# Patient Record
Sex: FEMALE | Race: WHITE | Employment: FULL TIME | URBAN - METROPOLITAN AREA
[De-identification: names, ages, dates, MRNs, and addresses within clinical notes are randomized per-mention and may not be internally consistent; named-entity substitution may affect disease eponyms.]

---

## 2020-03-11 ENCOUNTER — HOSPITAL ENCOUNTER (OUTPATIENT)
Dept: CT IMAGING | Age: 45
Discharge: HOME | End: 2020-03-11
Attending: INTERNAL MEDICINE
Payer: COMMERCIAL

## 2020-03-11 DIAGNOSIS — R07.9 CHEST PAIN: ICD-10-CM

## 2020-03-11 DIAGNOSIS — R94.39 ABNORMAL STRESS ELECTROCARDIOGRAM TEST USING TREADMILL: ICD-10-CM

## 2020-03-11 PROCEDURE — 75574 CT ANGIO HRT W/3D IMAGE: CPT

## 2020-03-11 PROCEDURE — 74011636320 HC RX REV CODE- 636/320: Performed by: STUDENT IN AN ORGANIZED HEALTH CARE EDUCATION/TRAINING PROGRAM

## 2020-03-11 RX ORDER — IODIXANOL 320 MG/ML
150 INJECTION, SOLUTION INTRAVASCULAR
Status: COMPLETED | OUTPATIENT
Start: 2020-03-11 | End: 2020-03-11

## 2020-03-11 RX ADMIN — IODIXANOL 150 ML: 320 INJECTION, SOLUTION INTRAVASCULAR at 13:00

## 2021-07-09 ENCOUNTER — HOSPITAL ENCOUNTER (OUTPATIENT)
Dept: PREADMISSION TESTING | Age: 46
Discharge: HOME | End: 2021-07-09
Payer: COMMERCIAL

## 2021-07-09 VITALS
WEIGHT: 196.3 LBS | TEMPERATURE: 98 F | RESPIRATION RATE: 20 BRPM | SYSTOLIC BLOOD PRESSURE: 156 MMHG | OXYGEN SATURATION: 100 % | BODY MASS INDEX: 33.51 KG/M2 | HEIGHT: 64 IN | DIASTOLIC BLOOD PRESSURE: 88 MMHG | HEART RATE: 61 BPM

## 2021-07-09 LAB
ABO + RH BLD: NORMAL
ANION GAP SERPL CALC-SCNC: 5 MMOL/L (ref 5–15)
BLOOD GROUP ANTIBODIES SERPL: NORMAL
BUN SERPL-MCNC: 18 MG/DL (ref 6–20)
BUN/CREAT SERPL: 28 (ref 12–20)
CALCIUM SERPL-MCNC: 8.5 MG/DL (ref 8.5–10.1)
CHLORIDE SERPL-SCNC: 104 MMOL/L (ref 97–108)
CO2 SERPL-SCNC: 30 MMOL/L (ref 21–32)
CREAT SERPL-MCNC: 0.64 MG/DL (ref 0.55–1.02)
ERYTHROCYTE [DISTWIDTH] IN BLOOD BY AUTOMATED COUNT: 14 % (ref 11.5–14.5)
GLUCOSE SERPL-MCNC: 116 MG/DL (ref 65–100)
HCT VFR BLD AUTO: 39.1 % (ref 35–47)
HGB BLD-MCNC: 13 G/DL (ref 11.5–16)
MCH RBC QN AUTO: 27.1 PG (ref 26–34)
MCHC RBC AUTO-ENTMCNC: 33.2 G/DL (ref 30–36.5)
MCV RBC AUTO: 81.6 FL (ref 80–99)
NRBC # BLD: 0 K/UL (ref 0–0.01)
NRBC BLD-RTO: 0 PER 100 WBC
PLATELET # BLD AUTO: 369 K/UL (ref 150–400)
PMV BLD AUTO: 10 FL (ref 8.9–12.9)
POTASSIUM SERPL-SCNC: 2.8 MMOL/L (ref 3.5–5.1)
RBC # BLD AUTO: 4.79 M/UL (ref 3.8–5.2)
SODIUM SERPL-SCNC: 139 MMOL/L (ref 136–145)
SPECIMEN EXP DATE BLD: NORMAL
WBC # BLD AUTO: 9.7 K/UL (ref 3.6–11)

## 2021-07-09 PROCEDURE — 36415 COLL VENOUS BLD VENIPUNCTURE: CPT

## 2021-07-09 PROCEDURE — 85027 COMPLETE CBC AUTOMATED: CPT

## 2021-07-09 PROCEDURE — 86901 BLOOD TYPING SEROLOGIC RH(D): CPT

## 2021-07-09 PROCEDURE — 93005 ELECTROCARDIOGRAM TRACING: CPT

## 2021-07-09 PROCEDURE — U0005 INFEC AGEN DETEC AMPLI PROBE: HCPCS

## 2021-07-09 PROCEDURE — 80048 BASIC METABOLIC PNL TOTAL CA: CPT

## 2021-07-09 RX ORDER — BISMUTH SUBSALICYLATE 262 MG
1 TABLET,CHEWABLE ORAL DAILY
COMMUNITY

## 2021-07-09 RX ORDER — MEGESTROL ACETATE 40 MG/1
40 TABLET ORAL DAILY
COMMUNITY

## 2021-07-09 RX ORDER — LEVOTHYROXINE SODIUM 25 UG/1
25 TABLET ORAL
COMMUNITY

## 2021-07-09 RX ORDER — DILTIAZEM HYDROCHLORIDE 120 MG/1
120 CAPSULE, EXTENDED RELEASE ORAL DAILY
COMMUNITY

## 2021-07-09 RX ORDER — LORATADINE 10 MG/1
10 TABLET ORAL
COMMUNITY

## 2021-07-09 RX ORDER — HYDROCHLOROTHIAZIDE 25 MG/1
25 TABLET ORAL DAILY
COMMUNITY

## 2021-07-09 NOTE — PERIOP NOTES
[address] , [address] [facility]   MAIN OR                                  [phone]   MAIN PRE OP                          [phone]                                                                                AMBULATORY PRE OP          [phone]  PRE-ADMISSION TESTING    [phone]   Surgery Date:     Thursday 7/15/21        Is surgery arrival time given by surgeon? NO  If NO, [facility] staff will call you between 3 and 7pm the day before your surgery with your arrival time. (If your surgery is on a Monday, we will call you the Friday before.)    Call [phone] after 7pm Monday-Friday if you did not receive this call. INSTRUCTIONS BEFORE YOUR SURGERY   When You  Arrive Arrive at the [address] [facility] on the day of your surgery  Have your insurance card, photo ID, and any copayment (if needed)   Food   and   Drink NO food or drink after midnight the night before surgery    This means NO water, gum, mints, coffee, juice, etc.  No alcohol (beer, wine, liquor) 24 hours before and after surgery   Medications to   TAKE   Morning of Surgery MEDICATIONS TO TAKE THE MORNING OF SURGERY WITH A SIP OF WATER:    Claritin   Diltiazem   Synthroid   Megace   Medications  To  STOP      7 days before surgery  Non-Steroidal anti-inflammatory Drugs (NSAID's): for example, Ibuprofen (Advil, Motrin), Naproxen (Aleve)   Aspirin, if taking for pain    Herbal supplements, vitamins, and fish oil   Other:  (Pain medications not listed above, including Tylenol may be taken)   Blood  Thinners     Bathing Clothing  Jewelry  Valuables      If you shower the morning of surgery, please do not apply anything to your skin (lotions, powders, deodorant, or makeup, especially mascara)   Follow Chlorhex[employer] body wash instructions provided to you during PAT appointment. Begin 3 days prior to surgery.    Do not shave or trim anywhere 24 hours before surgery   Wear your hair loose or down; no pony-tails, buns, or metal hair clips   Wear loose, comfortable, clean clothes   Wear glasses instead of contacts  [address], valuables, and jewelry, including body piercings, at home   If you were given an [employer], bring it on day of surgery. Going Home - or Spending the Night  SAME-DAY SURGERY: You must have a responsible adult drive you home and stay with you 24 hours after surgery   ADMITS: If your doctor is keeping you in the hospital after surgery, leave personal belongings/luggage in your car until you have a hospital room number. Hospital discharge time is 12 noon  Drivers must be here before 12 noon unless you are told differently   Special Instructions        Follow all instructions so your surgery wont be cancelled. Please, be on time. If a situation occurs and you are delayed the day of surgery, call  [phone]. If your physical condition changes (like a fever, cold, flu, etc.) call your surgeon. Home medication(s) reviewed and verified via    LIST   VERBAL   during PAT appointment. The patient was contacted by IN-PERSON    The patient verbalizes understanding of all instructions and      DOES NOT   need reinforcement.

## 2021-07-10 LAB
ATRIAL RATE: 56 BPM
CALCULATED P AXIS, ECG09: 45 DEGREES
CALCULATED R AXIS, ECG10: -12 DEGREES
CALCULATED T AXIS, ECG11: 46 DEGREES
DIAGNOSIS, 93000: NORMAL
P-R INTERVAL, ECG05: 158 MS
Q-T INTERVAL, ECG07: 426 MS
QRS DURATION, ECG06: 96 MS
QTC CALCULATION (BEZET), ECG08: 411 MS
SARS-COV-2, XPLCVT: NOT DETECTED
SOURCE, COVRS: NORMAL
VENTRICULAR RATE, ECG03: 56 BPM

## 2021-07-12 RX ORDER — POTASSIUM CHLORIDE 750 MG/1
20 TABLET, FILM COATED, EXTENDED RELEASE ORAL 2 TIMES DAILY
Qty: 28 TABLET | Refills: 0 | Status: SHIPPED | OUTPATIENT
Start: 2021-07-12 | End: 2021-07-19

## 2021-07-12 NOTE — PROGRESS NOTES
CMP shows low potassium at 2.8. Script sent in for Potassium 10meq 2 tablets BID x 7 days. Routed to surgeon and PCP for review. Called and notified patient.

## 2021-07-12 NOTE — PERIOP NOTES
Call placed to pt to notify her of low potassium level and prescription sent to pharmacy by PORSCHE Lnodon. Reviewed instructions of dosing x 7 days. Pt states she has had a low level and was supposed to be eating more bananas but was not doing as well with that as she should.  States she will be picking up her prescription today

## 2021-07-14 ENCOUNTER — ANESTHESIA EVENT (OUTPATIENT)
Dept: SURGERY | Age: 46
End: 2021-07-14
Payer: COMMERCIAL

## 2021-07-14 NOTE — ANESTHESIA PREPROCEDURE EVALUATION
Relevant Problems   No relevant active problems       Anesthetic History   No history of anesthetic complications            Review of Systems / Medical History  Patient summary reviewed, nursing notes reviewed and pertinent labs reviewed    Pulmonary  Within defined limits                 Neuro/Psych   Within defined limits           Cardiovascular    Hypertension                   GI/Hepatic/Renal  Within defined limits              Endo/Other      Hypothyroidism       Other Findings              Physical Exam    Airway  Mallampati: II  TM Distance: 4 - 6 cm  Neck ROM: normal range of motion   Mouth opening: Normal     Cardiovascular    Rhythm: regular  Rate: normal         Dental    Dentition: Lower dentition intact, Upper dentition intact and Caps/crowns     Pulmonary  Breath sounds clear to auscultation               Abdominal         Other Findings            Anesthetic Plan    ASA: 2  Anesthesia type: general          Induction: Intravenous  Anesthetic plan and risks discussed with: Patient

## 2021-07-15 ENCOUNTER — ANESTHESIA (OUTPATIENT)
Dept: SURGERY | Age: 46
End: 2021-07-15
Payer: COMMERCIAL

## 2021-07-15 ENCOUNTER — HOSPITAL ENCOUNTER (OUTPATIENT)
Age: 46
Discharge: HOME | End: 2021-07-16
Attending: STUDENT IN AN ORGANIZED HEALTH CARE EDUCATION/TRAINING PROGRAM | Admitting: STUDENT IN AN ORGANIZED HEALTH CARE EDUCATION/TRAINING PROGRAM
Payer: COMMERCIAL

## 2021-07-15 DIAGNOSIS — L76.82 PAIN AT SURGICAL INCISION: Primary | ICD-10-CM

## 2021-07-15 PROBLEM — N93.9 ABNORMAL UTERINE BLEEDING (AUB): Status: ACTIVE | Noted: 2021-07-15

## 2021-07-15 PROBLEM — D25.9 FIBROID UTERUS: Status: ACTIVE | Noted: 2021-07-15

## 2021-07-15 PROBLEM — N93.9 ABNORMAL UTERINE BLEEDING: Status: ACTIVE | Noted: 2021-07-15

## 2021-07-15 LAB — HCG UR QL: NEGATIVE

## 2021-07-15 PROCEDURE — 77030040922 HC BLNKT HYPOTHRM STRY -A

## 2021-07-15 PROCEDURE — 76210000037 HC AMBSU PH I REC 2 TO 2.5 HR: Performed by: STUDENT IN AN ORGANIZED HEALTH CARE EDUCATION/TRAINING PROGRAM

## 2021-07-15 PROCEDURE — 77030031139 HC SUT VCRL2 J&J -A: Performed by: STUDENT IN AN ORGANIZED HEALTH CARE EDUCATION/TRAINING PROGRAM

## 2021-07-15 PROCEDURE — 2709999900 HC NON-CHARGEABLE SUPPLY: Performed by: STUDENT IN AN ORGANIZED HEALTH CARE EDUCATION/TRAINING PROGRAM

## 2021-07-15 PROCEDURE — 74011250636 HC RX REV CODE- 250/636: Performed by: ANESTHESIOLOGY

## 2021-07-15 PROCEDURE — 77030008684 HC TU ET CUF COVD -B: Performed by: SURGERY

## 2021-07-15 PROCEDURE — 77030020263 HC SOL INJ SOD CL0.9% LFCR 1000ML: Performed by: STUDENT IN AN ORGANIZED HEALTH CARE EDUCATION/TRAINING PROGRAM

## 2021-07-15 PROCEDURE — 77030008756 HC TU IRR SUC STRY -B: Performed by: STUDENT IN AN ORGANIZED HEALTH CARE EDUCATION/TRAINING PROGRAM

## 2021-07-15 PROCEDURE — 74011250636 HC RX REV CODE- 250/636: Performed by: SURGERY

## 2021-07-15 PROCEDURE — 99218 HC RM OBSERVATION: CPT

## 2021-07-15 PROCEDURE — 76030000004 HC AMB SURG OR TIME 2 TO 2.5: Performed by: STUDENT IN AN ORGANIZED HEALTH CARE EDUCATION/TRAINING PROGRAM

## 2021-07-15 PROCEDURE — 74011250636 HC RX REV CODE- 250/636: Performed by: STUDENT IN AN ORGANIZED HEALTH CARE EDUCATION/TRAINING PROGRAM

## 2021-07-15 PROCEDURE — 74011000250 HC RX REV CODE- 250: Performed by: STUDENT IN AN ORGANIZED HEALTH CARE EDUCATION/TRAINING PROGRAM

## 2021-07-15 PROCEDURE — 77030020829: Performed by: STUDENT IN AN ORGANIZED HEALTH CARE EDUCATION/TRAINING PROGRAM

## 2021-07-15 PROCEDURE — 77030013079 HC BLNKT BAIR HGGR 3M -A: Performed by: SURGERY

## 2021-07-15 PROCEDURE — 77030034154 HC SHR COAG HARM ACE J&J -F: Performed by: STUDENT IN AN ORGANIZED HEALTH CARE EDUCATION/TRAINING PROGRAM

## 2021-07-15 PROCEDURE — 76060000063 HC AMB SURG ANES 1.5 TO 2 HR: Performed by: STUDENT IN AN ORGANIZED HEALTH CARE EDUCATION/TRAINING PROGRAM

## 2021-07-15 PROCEDURE — 74011000250 HC RX REV CODE- 250: Performed by: SURGERY

## 2021-07-15 PROCEDURE — 76030000003 HC AMB SURG OR TIME 1.5 TO 2: Performed by: STUDENT IN AN ORGANIZED HEALTH CARE EDUCATION/TRAINING PROGRAM

## 2021-07-15 PROCEDURE — 76060000064 HC AMB SURG ANES 2 TO 2.5 HR: Performed by: STUDENT IN AN ORGANIZED HEALTH CARE EDUCATION/TRAINING PROGRAM

## 2021-07-15 PROCEDURE — 77030002933 HC SUT MCRYL J&J -A: Performed by: STUDENT IN AN ORGANIZED HEALTH CARE EDUCATION/TRAINING PROGRAM

## 2021-07-15 PROCEDURE — 77030040361 HC SLV COMPR DVT MDII -B

## 2021-07-15 PROCEDURE — 74011250637 HC RX REV CODE- 250/637: Performed by: STUDENT IN AN ORGANIZED HEALTH CARE EDUCATION/TRAINING PROGRAM

## 2021-07-15 PROCEDURE — 77030010507 HC ADH SKN DERMBND J&J -B: Performed by: STUDENT IN AN ORGANIZED HEALTH CARE EDUCATION/TRAINING PROGRAM

## 2021-07-15 PROCEDURE — 77030026438 HC STYL ET INTUB CARD -A: Performed by: SURGERY

## 2021-07-15 PROCEDURE — 81025 URINE PREGNANCY TEST: CPT

## 2021-07-15 PROCEDURE — 88307 TISSUE EXAM BY PATHOLOGIST: CPT

## 2021-07-15 PROCEDURE — 77030012770 HC TRCR OPT FX AMR -B: Performed by: STUDENT IN AN ORGANIZED HEALTH CARE EDUCATION/TRAINING PROGRAM

## 2021-07-15 RX ORDER — SODIUM CHLORIDE, SODIUM LACTATE, POTASSIUM CHLORIDE, CALCIUM CHLORIDE 600; 310; 30; 20 MG/100ML; MG/100ML; MG/100ML; MG/100ML
100 INJECTION, SOLUTION INTRAVENOUS CONTINUOUS
Status: DISCONTINUED | OUTPATIENT
Start: 2021-07-15 | End: 2021-07-15 | Stop reason: HOSPADM

## 2021-07-15 RX ORDER — BUPIVACAINE HYDROCHLORIDE 5 MG/ML
INJECTION, SOLUTION EPIDURAL; INTRACAUDAL AS NEEDED
Status: DISCONTINUED | OUTPATIENT
Start: 2021-07-15 | End: 2021-07-15 | Stop reason: HOSPADM

## 2021-07-15 RX ORDER — DIPHENHYDRAMINE HYDROCHLORIDE 50 MG/ML
12.5 INJECTION, SOLUTION INTRAMUSCULAR; INTRAVENOUS AS NEEDED
Status: DISCONTINUED | OUTPATIENT
Start: 2021-07-15 | End: 2021-07-15 | Stop reason: HOSPADM

## 2021-07-15 RX ORDER — LIDOCAINE HYDROCHLORIDE 20 MG/ML
INJECTION, SOLUTION EPIDURAL; INFILTRATION; INTRACAUDAL; PERINEURAL AS NEEDED
Status: DISCONTINUED | OUTPATIENT
Start: 2021-07-15 | End: 2021-07-15 | Stop reason: HOSPADM

## 2021-07-15 RX ORDER — OXYCODONE AND ACETAMINOPHEN 5; 325 MG/1; MG/1
1 TABLET ORAL
Status: DISCONTINUED | OUTPATIENT
Start: 2021-07-15 | End: 2021-07-16 | Stop reason: HOSPADM

## 2021-07-15 RX ORDER — KETOROLAC TROMETHAMINE 30 MG/ML
30 INJECTION, SOLUTION INTRAMUSCULAR; INTRAVENOUS EVERY 6 HOURS
Status: DISCONTINUED | OUTPATIENT
Start: 2021-07-15 | End: 2021-07-16 | Stop reason: HOSPADM

## 2021-07-15 RX ORDER — HYDROMORPHONE HYDROCHLORIDE 1 MG/ML
.25-1 INJECTION, SOLUTION INTRAMUSCULAR; INTRAVENOUS; SUBCUTANEOUS
Status: DISCONTINUED | OUTPATIENT
Start: 2021-07-15 | End: 2021-07-15 | Stop reason: HOSPADM

## 2021-07-15 RX ORDER — HYDROMORPHONE HYDROCHLORIDE 2 MG/ML
INJECTION, SOLUTION INTRAMUSCULAR; INTRAVENOUS; SUBCUTANEOUS AS NEEDED
Status: DISCONTINUED | OUTPATIENT
Start: 2021-07-15 | End: 2021-07-15 | Stop reason: HOSPADM

## 2021-07-15 RX ORDER — SODIUM CHLORIDE 0.9 % (FLUSH) 0.9 %
5-40 SYRINGE (ML) INJECTION EVERY 8 HOURS
Status: DISCONTINUED | OUTPATIENT
Start: 2021-07-15 | End: 2021-07-15 | Stop reason: HOSPADM

## 2021-07-15 RX ORDER — SODIUM CHLORIDE 0.9 % (FLUSH) 0.9 %
5-40 SYRINGE (ML) INJECTION AS NEEDED
Status: DISCONTINUED | OUTPATIENT
Start: 2021-07-15 | End: 2021-07-16 | Stop reason: HOSPADM

## 2021-07-15 RX ORDER — HYDROMORPHONE HYDROCHLORIDE 1 MG/ML
1 INJECTION, SOLUTION INTRAMUSCULAR; INTRAVENOUS; SUBCUTANEOUS
Status: DISCONTINUED | OUTPATIENT
Start: 2021-07-15 | End: 2021-07-16 | Stop reason: HOSPADM

## 2021-07-15 RX ORDER — ROCURONIUM BROMIDE 10 MG/ML
INJECTION, SOLUTION INTRAVENOUS AS NEEDED
Status: DISCONTINUED | OUTPATIENT
Start: 2021-07-15 | End: 2021-07-15 | Stop reason: HOSPADM

## 2021-07-15 RX ORDER — LIDOCAINE HYDROCHLORIDE 10 MG/ML
0.1 INJECTION, SOLUTION EPIDURAL; INFILTRATION; INTRACAUDAL; PERINEURAL AS NEEDED
Status: DISCONTINUED | OUTPATIENT
Start: 2021-07-15 | End: 2021-07-15 | Stop reason: HOSPADM

## 2021-07-15 RX ORDER — PROPOFOL 10 MG/ML
INJECTION, EMULSION INTRAVENOUS AS NEEDED
Status: DISCONTINUED | OUTPATIENT
Start: 2021-07-15 | End: 2021-07-15 | Stop reason: HOSPADM

## 2021-07-15 RX ORDER — DIPHENHYDRAMINE HYDROCHLORIDE 50 MG/ML
12.5 INJECTION, SOLUTION INTRAMUSCULAR; INTRAVENOUS
Status: DISCONTINUED | OUTPATIENT
Start: 2021-07-15 | End: 2021-07-16 | Stop reason: HOSPADM

## 2021-07-15 RX ORDER — SODIUM CHLORIDE, SODIUM LACTATE, POTASSIUM CHLORIDE, CALCIUM CHLORIDE 600; 310; 30; 20 MG/100ML; MG/100ML; MG/100ML; MG/100ML
125 INJECTION, SOLUTION INTRAVENOUS CONTINUOUS
Status: DISCONTINUED | OUTPATIENT
Start: 2021-07-15 | End: 2021-07-15 | Stop reason: HOSPADM

## 2021-07-15 RX ORDER — SODIUM CHLORIDE 0.9 % (FLUSH) 0.9 %
5-40 SYRINGE (ML) INJECTION EVERY 8 HOURS
Status: DISCONTINUED | OUTPATIENT
Start: 2021-07-15 | End: 2021-07-16 | Stop reason: HOSPADM

## 2021-07-15 RX ORDER — MIDAZOLAM HYDROCHLORIDE 1 MG/ML
INJECTION, SOLUTION INTRAMUSCULAR; INTRAVENOUS AS NEEDED
Status: DISCONTINUED | OUTPATIENT
Start: 2021-07-15 | End: 2021-07-15 | Stop reason: HOSPADM

## 2021-07-15 RX ORDER — DOCUSATE SODIUM 100 MG/1
100 CAPSULE, LIQUID FILLED ORAL 2 TIMES DAILY
Status: DISCONTINUED | OUTPATIENT
Start: 2021-07-15 | End: 2021-07-16 | Stop reason: HOSPADM

## 2021-07-15 RX ORDER — SODIUM CHLORIDE, SODIUM LACTATE, POTASSIUM CHLORIDE, CALCIUM CHLORIDE 600; 310; 30; 20 MG/100ML; MG/100ML; MG/100ML; MG/100ML
75 INJECTION, SOLUTION INTRAVENOUS CONTINUOUS
Status: DISCONTINUED | OUTPATIENT
Start: 2021-07-15 | End: 2021-07-15 | Stop reason: HOSPADM

## 2021-07-15 RX ORDER — HYDRALAZINE HYDROCHLORIDE 20 MG/ML
5 INJECTION INTRAMUSCULAR; INTRAVENOUS ONCE
Status: COMPLETED | OUTPATIENT
Start: 2021-07-15 | End: 2021-07-15

## 2021-07-15 RX ORDER — ONDANSETRON 2 MG/ML
4 INJECTION INTRAMUSCULAR; INTRAVENOUS
Status: DISCONTINUED | OUTPATIENT
Start: 2021-07-15 | End: 2021-07-16 | Stop reason: HOSPADM

## 2021-07-15 RX ORDER — SODIUM CHLORIDE 0.9 % (FLUSH) 0.9 %
5-40 SYRINGE (ML) INJECTION AS NEEDED
Status: DISCONTINUED | OUTPATIENT
Start: 2021-07-15 | End: 2021-07-15 | Stop reason: HOSPADM

## 2021-07-15 RX ORDER — NIFEDIPINE 10 MG/1
10 CAPSULE ORAL AS NEEDED
Status: DISCONTINUED | OUTPATIENT
Start: 2021-07-15 | End: 2021-07-16 | Stop reason: HOSPADM

## 2021-07-15 RX ORDER — ONDANSETRON 2 MG/ML
INJECTION INTRAMUSCULAR; INTRAVENOUS AS NEEDED
Status: DISCONTINUED | OUTPATIENT
Start: 2021-07-15 | End: 2021-07-15 | Stop reason: HOSPADM

## 2021-07-15 RX ORDER — ONDANSETRON 2 MG/ML
4 INJECTION INTRAMUSCULAR; INTRAVENOUS AS NEEDED
Status: DISCONTINUED | OUTPATIENT
Start: 2021-07-15 | End: 2021-07-15 | Stop reason: HOSPADM

## 2021-07-15 RX ORDER — DEXAMETHASONE SODIUM PHOSPHATE 4 MG/ML
INJECTION, SOLUTION INTRA-ARTICULAR; INTRALESIONAL; INTRAMUSCULAR; INTRAVENOUS; SOFT TISSUE AS NEEDED
Status: DISCONTINUED | OUTPATIENT
Start: 2021-07-15 | End: 2021-07-15 | Stop reason: HOSPADM

## 2021-07-15 RX ADMIN — HYDROMORPHONE HYDROCHLORIDE 0.5 MG: 2 INJECTION INTRAMUSCULAR; INTRAVENOUS; SUBCUTANEOUS at 10:32

## 2021-07-15 RX ADMIN — CEFAZOLIN SODIUM 2 G: 1 POWDER, FOR SOLUTION INTRAMUSCULAR; INTRAVENOUS at 10:10

## 2021-07-15 RX ADMIN — ROCURONIUM BROMIDE 50 MG: 10 INJECTION INTRAVENOUS at 09:53

## 2021-07-15 RX ADMIN — KETOROLAC TROMETHAMINE 30 MG: 30 INJECTION, SOLUTION INTRAMUSCULAR; INTRAVENOUS at 17:30

## 2021-07-15 RX ADMIN — HYDROMORPHONE HYDROCHLORIDE 0.5 MG: 2 INJECTION INTRAMUSCULAR; INTRAVENOUS; SUBCUTANEOUS at 11:15

## 2021-07-15 RX ADMIN — KETOROLAC TROMETHAMINE 30 MG: 30 INJECTION, SOLUTION INTRAMUSCULAR; INTRAVENOUS at 22:55

## 2021-07-15 RX ADMIN — OXYCODONE HYDROCHLORIDE AND ACETAMINOPHEN 1 TABLET: 5; 325 TABLET ORAL at 21:09

## 2021-07-15 RX ADMIN — ONDANSETRON HYDROCHLORIDE 4 MG: 2 SOLUTION INTRAMUSCULAR; INTRAVENOUS at 11:49

## 2021-07-15 RX ADMIN — DEXAMETHASONE SODIUM PHOSPHATE 8 MG: 4 INJECTION, SOLUTION INTRAMUSCULAR; INTRAVENOUS at 10:18

## 2021-07-15 RX ADMIN — DOCUSATE SODIUM 100 MG: 100 CAPSULE ORAL at 17:35

## 2021-07-15 RX ADMIN — PROPOFOL 50 MG: 10 INJECTION, EMULSION INTRAVENOUS at 11:15

## 2021-07-15 RX ADMIN — MIDAZOLAM 2 MG: 1 INJECTION, SOLUTION INTRAMUSCULAR; INTRAVENOUS at 09:48

## 2021-07-15 RX ADMIN — PROPOFOL 200 MG: 10 INJECTION, EMULSION INTRAVENOUS at 09:53

## 2021-07-15 RX ADMIN — HYDROMORPHONE HYDROCHLORIDE 0.5 MG: 2 INJECTION INTRAMUSCULAR; INTRAVENOUS; SUBCUTANEOUS at 11:01

## 2021-07-15 RX ADMIN — Medication 10 ML: at 17:31

## 2021-07-15 RX ADMIN — HYDRALAZINE HYDROCHLORIDE 5 MG: 20 INJECTION INTRAMUSCULAR; INTRAVENOUS at 13:04

## 2021-07-15 RX ADMIN — LIDOCAINE HYDROCHLORIDE 100 MG: 20 INJECTION, SOLUTION INTRAVENOUS at 09:53

## 2021-07-15 RX ADMIN — HYDROMORPHONE HYDROCHLORIDE 0.5 MG: 2 INJECTION INTRAMUSCULAR; INTRAVENOUS; SUBCUTANEOUS at 11:10

## 2021-07-15 RX ADMIN — SODIUM CHLORIDE, POTASSIUM CHLORIDE, SODIUM LACTATE AND CALCIUM CHLORIDE: 600; 310; 30; 20 INJECTION, SOLUTION INTRAVENOUS at 09:47

## 2021-07-15 NOTE — DISCHARGE INSTRUCTIONS
After Care Instructions for Laparoscopic Hysterectomy        1. You may advance your diet as tolerated    2. Avoid heavy lifting or straining. Gradually increase your activity. First try walking and doing light activity around the house. Most women can return to work 2-3 weeks after the procedure. You should speak with your doctor about your individual return to work plan and any other restrictions. Nothing in the vagina, no intercourse for 6 full weeks until cleared by your doctor     3. You may take showers. Please do not take baths until you are seen for your post-operative visit. 4. It is not unusual to experience some discomfort under your ribs and/or soreness of your neck and shoulders over the first 1-2 days. This is due to the gas used in your abdomen during the surgery to help your doctor see your pelvic organs. This gas gets naturally reabsorbed. The right shoulder is often more sore than the left. 5. It is not unusual to have vaginal spotting lasting up to 2 weeks off and on. Some women do not experience any bleeding at all. You should call your doctor immediately if you ever experience heavy vaginal bleeding or gushes of any liquid coming from your vagina that does not seem like the amount you would expect for your normal vaginal discharge. 6. Bruises may appear around the incisions and will gradually resolve as they heal.    7. There are several ways that your incisions may be closed. Most of these do not require the removal of any sutures. Some patients may have skin glue, Dermabond, placed over the incisions. Do not try to peel this off, it will gradually wear off on its own. Other patients will have small paper strips placed over the incisions. Allow these to fall off on their own or your doctor will remove them. No other special dressing is required.     8. Call the office at [phone]  to report any of the following problems: abdominal pain that is increasing in severity despite using the prescribed pain medication, heavy vaginal bleeding, drainage or separation of your incision(s), temperature greater than 100.4 or persistent nausea and vomiting. 9. Call the office to be seen for your postoperative visit, approximately 2 and then 6 weeks after surgery    10.  Pain control: take medications as directed, adding over the counter ibuprofen (Motrin/ Advil), either 2 tablets every 4 hours or 3 tablets every 6 hours,  and/ or Tylenol as needed

## 2021-07-15 NOTE — PERIOP NOTES
TRANSFER - OUT REPORT:    Verbal report given to [facility] RN(name) on Bartolo Thomas  being transferred to [facility](unit) for routine post - op       Report consisted of patients Situation, Background, Assessment and   Recommendations(SBAR). Information from the following report(s) SBAR, Kardex and MAR was reviewed with the receiving nurse. Lines:   Peripheral IV 07/15/21 Posterior;Right Hand (Active)   Site Assessment Clean, dry, & intact 07/15/21 1300   Phlebitis Assessment 0 07/15/21 1300   Infiltration Assessment 0 07/15/21 1300   Dressing Status Clean, dry, & intact 07/15/21 1300   Dressing Type Transparent 07/15/21 1300   Hub Color/Line Status Pink; Infusing 07/15/21 1300   Alcohol Cap Used Yes 07/15/21 0826        Opportunity for questions and clarification was provided.       Patient transported with:   Registered Nurse

## 2021-07-15 NOTE — ROUTINE PROCESS
1417: TRANSFER - IN REPORT:Telephone Report Received at 4315 from [address] [place] [address]. 1445: TRANSFER - IN REPORT:     Suman Gamble  being received from [facility] (unit) for routine progression of care      Report consisted of patients Situation, Background, Assessment and   Recommendations(SBAR). Information from the following report(s) SBAR, Kardex, Intake/Output, MAR and Recent Results was reviewed with the receiving nurse. Opportunity for questions and clarification was provided. Assessment completed upon patients arrival to unit and care assumed. Patient ambulated to the bathroom upon admission with assistance by [facility] RN and voided a large amount of clear yellow urine (was not measured). Patient returned to bed, tolerated ambulation well. Patient given flor crackers and iced water, encouraged to increase her po fluid intake as she is not getting IV fluids anymore. Bowel sounds are active, patient advanced to a regular diet. BP on admission is elevated 158/74, - called Dr. Alberto Brito to inquire as to whether or not we are treating her BP while she is here. Patient states she did take her Diltiazem ER this morning. 1530: Received call back from Dr. Alberto Brito- she is comfortable with patient's blood pressure currently. TORB for po nifedipine 10 mg for if BP persistently 160/110 or above and please notify MD if nifedipine given. 6:44 PM Patient sitting in bed eating her dinner. Patient's most recent blood pressure in this hour WDL (137/89). Patient states she has some mild gas pain, rates her pain a 2/10 and plans to walk in the [place]opez after eating. Bedside shift change report given to Sarah Block RN (oncoming nurse) by Vesna Burgos RN (offgoing nurse). Report included the following information SBAR, Kardex, Intake/Output, MAR and Recent Results.

## 2021-07-15 NOTE — ANESTHESIA POSTPROCEDURE EVALUATION
Procedure(s):  TOTAL LAPAROSCOPIC HYSTERECTOMY. general    Anesthesia Post Evaluation        Patient location during evaluation: PACU  Level of consciousness: awake  Pain management: adequate  Airway patency: patent  Anesthetic complications: no  Cardiovascular status: acceptable  Respiratory status: acceptable  Hydration status: acceptable  Post anesthesia nausea and vomiting:  none      INITIAL Post-op Vital signs:   Vitals Value Taken Time   /81 07/15/21 1330   Temp 36.7 °C (98.1 °F) 07/15/21 1300   Pulse 76 07/15/21 1335   Resp 13 07/15/21 1335   SpO2 100 % 07/15/21 1335   Vitals shown include unvalidated device data.

## 2021-07-15 NOTE — H&P
Day of surgery H&P Update     Pt seen and examined. No interval changes. Please see paper H&P from the office Diagnosis is abnormal uterine bleeding, failed endometrial ablation. Planned procedure is total laparoscopic hysterectomy. Consents reviewed and signed and all questions answered. SCDs for DVT PPx. ABX PPx Ancef 2gm IV. Proceed to OR.      Isaac Emerson,

## 2021-07-15 NOTE — BRIEF OP NOTE
Brief Postoperative Note    Patient: Oksana Chirinos  YOB: 1975  MRN: [id number]    Date of Procedure: 7/15/2021     Pre-Op Diagnosis: ABNORMAL UTERINE BLEEDING    Post-Op Diagnosis: Same as preoperative diagnosis.       Procedure(s):  TOTAL LAPAROSCOPIC HYSTERECTOMY    Surgeon(s):  DO Korina Ortega MD    Surgical Assistant: None    Anesthesia: General     Estimated Blood Loss (mL): 250cc, IVF 900cc LR, UOP 745SW     Complications: None    Specimens: Uterus and cervix     Implants: none    Drains:  none    Findings: Bulbous uterus ~9wk size     Electronically Signed by Brandy Oneill DO on 7/15/2021 at 11:45 AM

## 2021-07-16 VITALS
HEART RATE: 81 BPM | WEIGHT: 196 LBS | HEIGHT: 64 IN | SYSTOLIC BLOOD PRESSURE: 128 MMHG | BODY MASS INDEX: 33.46 KG/M2 | TEMPERATURE: 98.9 F | OXYGEN SATURATION: 98 % | DIASTOLIC BLOOD PRESSURE: 74 MMHG | RESPIRATION RATE: 18 BRPM

## 2021-07-16 PROCEDURE — 74011250636 HC RX REV CODE- 250/636: Performed by: STUDENT IN AN ORGANIZED HEALTH CARE EDUCATION/TRAINING PROGRAM

## 2021-07-16 PROCEDURE — 74011250637 HC RX REV CODE- 250/637: Performed by: STUDENT IN AN ORGANIZED HEALTH CARE EDUCATION/TRAINING PROGRAM

## 2021-07-16 PROCEDURE — 99218 HC RM OBSERVATION: CPT

## 2021-07-16 RX ORDER — OXYCODONE AND ACETAMINOPHEN 5; 325 MG/1; MG/1
1 TABLET ORAL
Qty: 18 TABLET | Refills: 0 | Status: SHIPPED | OUTPATIENT
Start: 2021-07-16 | End: 2021-07-19

## 2021-07-16 RX ORDER — IBUPROFEN 800 MG/1
800 TABLET ORAL
Qty: 21 TABLET | Refills: 0 | Status: SHIPPED | OUTPATIENT
Start: 2021-07-16

## 2021-07-16 RX ADMIN — KETOROLAC TROMETHAMINE 30 MG: 30 INJECTION, SOLUTION INTRAMUSCULAR; INTRAVENOUS at 06:33

## 2021-07-16 RX ADMIN — OXYCODONE HYDROCHLORIDE AND ACETAMINOPHEN 1 TABLET: 5; 325 TABLET ORAL at 03:20

## 2021-07-16 NOTE — PROGRESS NOTES
Patient discharge to home with significant other. Discharge instructions reviewed and all questions answered. Per patient and significant other, no further questions. Patient declined supplies. Prescriptions given x2 (ibuprofen, percocet).

## 2021-07-16 NOTE — PROGRESS NOTES
Gynecology Progress Note    Patient doing well post-op day 1 from Procedure(s):  TOTAL LAPAROSCOPIC HYSTERECTOMY without significant complaints. Pain controlled on current medication. Voiding without difficulty. Patient is passing flatus. Vitals:  Blood pressure (!) 141/84, pulse 80, temperature 98.6 °F (37 °C), resp. rate 16, height 5' 4\" (1.626 m), weight 88.9 kg (196 lb), SpO2 97 %. Temp (24hrs), Av.1 °F (36.7 °C), Min:97.7 °F (36.5 °C), Max:98.6 °F (37 °C)        Exam:  Patient without distress. Abdomen soft,  nontender. Incisions dry and clean without erythema. Lower extremities are negative for swelling, cords, or tenderness. Lab/Data Review: All lab results for the last 24 hours reviewed. Assessment and Plan:  Patient appears to be having uncomplicated post Procedure(s):  TOTAL LAPAROSCOPIC HYSTERECTOMY course. Continue routine post-op care. Plan discharge home today. F/u as scheduled.

## 2021-07-22 NOTE — OP NOTES
[facility]  OPERATIVE REPORT    Name:  Magda Best  MR#:  [id number]  :  1975  ACCOUNT #:  [de-identified]  DATE OF SERVICE:  07/15/2021    PREOPERATIVE DIAGNOSIS:  A 39year-old,  4, para 3, aborta 1, with history of abnormal uterine bleeding. POSTOPERATIVE DIAGNOSIS:  A 39year-old,  4, para 3, aborta 1, with history of abnormal uterine bleeding. PROCEDURE PERFORMED:  Total laparoscopic hysterectomy. SURGEON:  Consuelo Barragan. [address] [place] [address]    ASSISTANT:  Rupinder Resendez MD    ANESTHESIA:  General.    COMPLICATIONS:  None. SPECIMENS REMOVED:  Uterus and cervix. IMPLANTS:  None. DRAINS:  None. ESTIMATED BLOOD LOSS:  250 mL. IV FLUIDS:  900 mL of crystalloids. URINE OUTPUT:  600 mL. FINDINGS:  Bulbous uterus approximately 9-week size, weighed 365 g. INDICATIONS:  The patient is a 17-year-old,  4, para 3, aborta 1, who had been followed in the office over the last 2 years for abnormal uterine bleeding. She failed Mirena IUD, subsequently had an ablation, has continued to have abnormal bleeding after the ablation, not controlled by hormonal management with oral progestin. Given the ongoing issues of bleeding, she opted for definitive surgical management with a hysterectomy. Risks, benefits, and alternatives were reviewed and all questions were answered prior to going to the operating room. PROCEDURE:  The patient was taken to the operating room. She was positioned supine on the operating room table. After adequate anesthesia was achieved via general endotracheal anesthesia, she had her legs positioned into Ranjit stirrups. Her abdomen, perineum, vagina were prepped and she was draped in sterile fashion. After a time-out was performed, a sterile speculum was inserted into the vagina and a VCare manipulator was placed. Attention was then turned to the abdominal portion of the procedure.   A 5 mm incision was made in the umbilicus. Direct optical entry was used to obtain entry into the abdomen. The abdomen was insufflated. Lower quadrant ports both 5 mm were placed under direct visualization. The patient was placed in steep Trendelenburg positioning. Uterus and ovaries were inspected. The hysterectomy was begun using a Harmonic scalpel to sever the uterine ovarian ligament carrying down through the broad ligament on the patient's right side. The bladder flap was developed and the bladder skeletonized off the lower uterine segment and cervix. The uterine arteries were skeletonized, cauterized first using the bipolar and then cut using the Harmonic. The same procedure was carried out on the patient's left side carefully immobilizing the bladder off inferiorly and the colpotomy was then made using the Harmonic scalpel, was carried around circumferentially. Once the colpotomy was complete, the procedure was finished from below. The specimen was removed and the vaginal cuff was closed using 0 Vicryl suture in the running locked fashion with good hemostasis. The operators then looked once more from above. The cuff was noted to be hemostatic. Both ureters were visualized vermiculating and the pelvis well away from the surgical field. All pedicles were noted to be hemostatic. The lower quadrant ports were removed and skin was closed using 4-0 Monocryl and Dermabond. All counts were correct at the end of the case. There were no complications.       DO SHYLA [email or web address]  D:  07/22/2021 10:05  T:  07/22/2021 15:01  JOB #:  [id number]

## (undated) DEVICE — COVER,TABLE,60X90,STERILE: Brand: MEDLINE

## (undated) DEVICE — SURGICAL PROCEDURE PACK GYN LAPAROSCOPY CUST SMH LF

## (undated) DEVICE — STERILE POLYISOPRENE POWDER-FREE SURGICAL GLOVES WITH EMOLLIENT COATING: Brand: PROTEXIS

## (undated) DEVICE — EVAC SMOKE SEECLEAR XCL -- SEE CLEAR

## (undated) DEVICE — STERILE POLYISOPRENE POWDER-FREE SURGICAL GLOVES: Brand: PROTEXIS

## (undated) DEVICE — COUNTER NDL 20 COUNT HLD 40 DBL MAG ADH

## (undated) DEVICE — TRAY PREP DRY W/ PREM GLV 2 APPL 6 SPNG 2 UNDPD 1 OVERWRAP

## (undated) DEVICE — SPONGE GZ W4XL4IN COT RADPQ HIGHLY ABSRB

## (undated) DEVICE — NEEDLE HYPO 22GA L1.5IN BLK S STL HUB POLYPR SHLD REG BVL

## (undated) DEVICE — PAD,SANITARY,11 IN,MAXI,N-STRL,IND WRAP: Brand: MEDLINE

## (undated) DEVICE — PREP SKN CHLRAPRP APL 26ML STR --

## (undated) DEVICE — TROCAR: Brand: KII® SLEEVE

## (undated) DEVICE — LAPAROSCOPIC TROCAR SLEEVE/SINGLE USE: Brand: KII® OPTICAL ACCESS SYSTEM

## (undated) DEVICE — REM POLYHESIVE ADULT PATIENT RETURN ELECTRODE: Brand: VALLEYLAB

## (undated) DEVICE — SYR 10ML LUER LOK 1/5ML GRAD --

## (undated) DEVICE — SUTURE MCRYL SZ 4-0 L27IN ABSRB UD L19MM PS-2 1/2 CIR PRIM Y426H

## (undated) DEVICE — DERMABOND SKIN ADH 0.7ML -- DERMABOND ADVANCED 12/BX

## (undated) DEVICE — Device

## (undated) DEVICE — CANISTER, RIGID, 3000CC: Brand: MEDLINE INDUSTRIES, INC.

## (undated) DEVICE — COVER LT HNDL PLAS RIG 1 PER PK

## (undated) DEVICE — SUTURE VCRL SZ 0 L36IN ABSRB UD L36MM CT-1 1/2 CIR J946H

## (undated) DEVICE — SOLUTION IRRIG 1000ML 0.9% SOD CHL USP POUR PLAS BTL

## (undated) DEVICE — SHEARS ENDOSCP L36CM DIA5MM ULTRASONIC CRV TIP W/ ADV